# Patient Record
Sex: MALE | Race: WHITE | NOT HISPANIC OR LATINO | URBAN - METROPOLITAN AREA
[De-identification: names, ages, dates, MRNs, and addresses within clinical notes are randomized per-mention and may not be internally consistent; named-entity substitution may affect disease eponyms.]

---

## 2017-02-10 ENCOUNTER — IMPORTED ENCOUNTER (OUTPATIENT)
Dept: URBAN - METROPOLITAN AREA CLINIC 50 | Facility: CLINIC | Age: 77
End: 2017-02-10

## 2017-03-07 ENCOUNTER — IMPORTED ENCOUNTER (OUTPATIENT)
Dept: URBAN - METROPOLITAN AREA CLINIC 50 | Facility: CLINIC | Age: 77
End: 2017-03-07

## 2017-03-10 ENCOUNTER — IMPORTED ENCOUNTER (OUTPATIENT)
Dept: URBAN - METROPOLITAN AREA CLINIC 50 | Facility: CLINIC | Age: 77
End: 2017-03-10

## 2017-03-23 ENCOUNTER — IMPORTED ENCOUNTER (OUTPATIENT)
Dept: URBAN - METROPOLITAN AREA CLINIC 50 | Facility: CLINIC | Age: 77
End: 2017-03-23

## 2017-03-24 ENCOUNTER — IMPORTED ENCOUNTER (OUTPATIENT)
Dept: URBAN - METROPOLITAN AREA CLINIC 50 | Facility: CLINIC | Age: 77
End: 2017-03-24

## 2017-03-31 ENCOUNTER — IMPORTED ENCOUNTER (OUTPATIENT)
Dept: URBAN - METROPOLITAN AREA CLINIC 50 | Facility: CLINIC | Age: 77
End: 2017-03-31

## 2017-04-06 ENCOUNTER — IMPORTED ENCOUNTER (OUTPATIENT)
Dept: URBAN - METROPOLITAN AREA CLINIC 50 | Facility: CLINIC | Age: 77
End: 2017-04-06

## 2017-04-14 ENCOUNTER — IMPORTED ENCOUNTER (OUTPATIENT)
Dept: URBAN - METROPOLITAN AREA CLINIC 50 | Facility: CLINIC | Age: 77
End: 2017-04-14

## 2017-04-27 ENCOUNTER — IMPORTED ENCOUNTER (OUTPATIENT)
Dept: URBAN - METROPOLITAN AREA CLINIC 50 | Facility: CLINIC | Age: 77
End: 2017-04-27

## 2017-05-05 ENCOUNTER — IMPORTED ENCOUNTER (OUTPATIENT)
Dept: URBAN - METROPOLITAN AREA CLINIC 50 | Facility: CLINIC | Age: 77
End: 2017-05-05

## 2017-05-08 NOTE — PATIENT DISCUSSION
(S05.01XD) Inj conjunctiva and corneal abrasion w/o fb, right eye, subs - Assesment : Examination revealed corneal abrasion. Pt seen in ER today for K abrasion. - Plan : Monitor for changes. Call with increased symptoms. Pressure patch and Bacitracin yung applied in the office- leave patch on for 24 hours. D/C EES yung. Start Polytrim OD QID after patch removed tomorrow afternoon. RTC 2 DAYS FOR F/U.

## 2017-05-10 NOTE — PATIENT DISCUSSION
(S05.01XD) Inj conjunctiva and corneal abrasion w/o fb, right eye, subs - Assesment : Examination revealed corneal abrasion healing well. - Plan : Monitor for changes. Call with increased symptoms. Continue Polytrim OD QID for 5 more days and then D/C.  RV as needed in future or with increased problems/symptoms.

## 2017-05-22 NOTE — PATIENT DISCUSSION
(S05. 02XA) Inj conjunctiva and corneal abrasion w/o fb, left eye, init - Assesment : Possible corneal abrasion, Patient was possibly poked or bumped in the eye and was seen in the ER over the weekend. unable to assess today. + Lid swelling. - Plan : Recommend starting polytrim QID OS and erythromycin ointment BID OS. Monitor for changes. RV Wednesday follow up.

## 2017-11-20 NOTE — PATIENT DISCUSSION
(S05. 02XA) Inj conjunctiva and corneal abrasion w/o fb, left eye, init - Assesment : Examination reveals history of corneal abrasion. No pathology today. - Plan : Monitor for changes. RV in 1-2 Years for Complete Exam, sooner with problems or changes in vision.

## 2018-02-12 ENCOUNTER — IMPORTED ENCOUNTER (OUTPATIENT)
Dept: URBAN - METROPOLITAN AREA CLINIC 50 | Facility: CLINIC | Age: 78
End: 2018-02-12

## 2019-03-11 NOTE — PATIENT DISCUSSION
(S05. 02XA) Inj conjunctiva and corneal abrasion w/o fb, left eye, init - Assesment : Examination reveals history of corneal abrasion OS. Possible abrasion OS with fingernail. No appreciable discharge OS. unable to perform examination today OS. - Plan : Recommend starting polytrim QID OS and erythromycin ointment BID OS. Monitor for changes. RV Thursday follow up.

## 2019-03-13 NOTE — PATIENT DISCUSSION
(S05.02XD) Inj conjunctiva and corneal abrasion w/o fb, left eye, subs - Assesment : Examination reveals corneal abrasion @ 8 o'clock  OS still present. No infiltrates. - Plan : Continue  polytrim QID OS and erythromycin ointment BID OS. Monitor for changes. RTC Monday follow up.

## 2021-04-18 ASSESSMENT — TONOMETRY
OD_IOP_MMHG: 14
OD_IOP_MMHG: 15
OD_IOP_MMHG: 16
OS_IOP_MMHG: 38
OS_IOP_MMHG: 17
OS_IOP_MMHG: 14
OD_IOP_MMHG: 12
OS_IOP_MMHG: 13
OD_IOP_MMHG: 14
OS_IOP_MMHG: 13
OS_IOP_MMHG: 12
OD_IOP_MMHG: 15
OS_IOP_MMHG: 15
OD_IOP_MMHG: 28

## 2021-04-18 ASSESSMENT — VISUAL ACUITY
OS_CC: J1-
OD_SC: 20/60-
OS_SC: 20/40
OD_SC: 20/25-1
OD_SC: 20/40
OD_PH: 20/25
OD_CC: 20/40
OS_BAT: 20/40
OD_OTHER: >20/400.
OS_SC: 20/20
OS_BAT: 20/400
OS_OTHER: 20/40. >20/400.
OD_CC: J1
OD_SC: 20/40
OD_SC: 20/30+2
OD_BAT: 20/30
OD_OTHER: 20/50. >20/400.
OS_OTHER: 20/25. 20/30.
OD_BAT: 20/50
OS_CC: J1
OS_BAT: 20/25
OS_CC: 20/400
OD_SC: 20/20
OS_OTHER: 20/400.
OS_CC: 20/40
OD_OTHER: 20/30. 20/40.
OD_CC: J1-
OS_SC: 20/20
OS_SC: 20/20-1
OS_SC: 20/20
OD_BAT: >20/400